# Patient Record
Sex: MALE | Race: WHITE | HISPANIC OR LATINO | Employment: FULL TIME | URBAN - METROPOLITAN AREA
[De-identification: names, ages, dates, MRNs, and addresses within clinical notes are randomized per-mention and may not be internally consistent; named-entity substitution may affect disease eponyms.]

---

## 2024-05-28 ENCOUNTER — HOSPITAL ENCOUNTER (EMERGENCY)
Facility: MEDICAL CENTER | Age: 54
End: 2024-05-28
Attending: EMERGENCY MEDICINE
Payer: OTHER MISCELLANEOUS

## 2024-05-28 VITALS
DIASTOLIC BLOOD PRESSURE: 87 MMHG | RESPIRATION RATE: 16 BRPM | HEART RATE: 70 BPM | TEMPERATURE: 97.8 F | WEIGHT: 211.64 LBS | SYSTOLIC BLOOD PRESSURE: 148 MMHG | OXYGEN SATURATION: 96 %

## 2024-05-28 DIAGNOSIS — S05.01XA ABRASION OF RIGHT CORNEA, INITIAL ENCOUNTER: Primary | ICD-10-CM

## 2024-05-28 DIAGNOSIS — H18.891 CORNEAL RUST RING OF RIGHT EYE: ICD-10-CM

## 2024-05-28 PROCEDURE — 700101 HCHG RX REV CODE 250: Performed by: EMERGENCY MEDICINE

## 2024-05-28 PROCEDURE — 99283 EMERGENCY DEPT VISIT LOW MDM: CPT

## 2024-05-28 RX ORDER — PROPARACAINE HYDROCHLORIDE 5 MG/ML
1 SOLUTION/ DROPS OPHTHALMIC ONCE
Status: COMPLETED | OUTPATIENT
Start: 2024-05-28 | End: 2024-05-28

## 2024-05-28 RX ORDER — ERYTHROMYCIN 5 MG/G
1 OINTMENT OPHTHALMIC 3 TIMES DAILY
Qty: 3.5 G | Refills: 0 | Status: ACTIVE | OUTPATIENT
Start: 2024-05-28 | End: 2024-06-02

## 2024-05-28 RX ADMIN — PROPARACAINE HYDROCHLORIDE 1 DROP: 5 SOLUTION/ DROPS OPHTHALMIC at 15:00

## 2024-05-28 RX ADMIN — FLUORESCEIN SODIUM 1 MG: 1 STRIP OPHTHALMIC at 15:00

## 2024-05-28 NOTE — ED NOTES
Discharge teaching and paperwork provided and all questions/concerns answered. VSS, assessment stable. Given information regarding Rx. Patient discharged to the care of self and ambulated out of the ED.

## 2024-05-28 NOTE — ED TRIAGE NOTES
Pt to triage .  Chief Complaint   Patient presents with    Eye Pain     Pt c/o right eye pain/itching after getting possible insulation in eye at work Friday

## 2024-05-28 NOTE — ED PROVIDER NOTES
ER Provider Note    Scribed for Dwayne Gillis Ii, M.d. by Rain Renteria. 5/28/2024  2:34 PM    Primary Care Provider: No primary care provider noted.     CHIEF COMPLAINT   Chief Complaint   Patient presents with    Eye Pain     Pt c/o right eye pain/itching after getting possible insulation in eye at work Friday          HPI/ROS  LIMITATION TO HISTORY   Select: Language Mexican,  Used  ID: [id number]  OUTSIDE HISTORIAN(S):  None    Slick Whitaker is a 53 y.o. male who presents to the ED complaining of eye pain onset 4 days ago. The patient explains that he had a piece of metal dislodged into the eye, while working on a roof at work. He states he was seen at [facility], where they were able to remove a small piece of the metal, however he states they were unsure if there is any metal still present.  He states Urgent Care recommended that the patient is seen at the ER prior to being seen by an Ophthalmologist. The patient states he is not experiencing any pain currently at bedside.     Mr. Henry Dean brought the records from [facility] (outside urgent care).  I reviewed the records and it details removal of a small piece of metal from the 3 o'clock position of the cornea.  He was given tobramycin drops but unclear if these were prescribed.  Provider noted that they had arranged for him to be seen at ophthalmology, Dr. Mcgrath's clinic      PAST MEDICAL HISTORY  History reviewed. No pertinent past medical history.    SURGICAL HISTORY  No past surgical history noted.     FAMILY HISTORY  No family history noted.     SOCIAL HISTORY   Works construction, installs insulation    CURRENT MEDICATIONS  Previous Medications    No medications noted.        ALLERGIES  Patient has no known allergies.    PHYSICAL EXAM  BP (!) 150/87   Pulse 87   Temp 36.7 °C (98 °F) (Temporal)   Resp 16   Wt 96 kg (211 lb 10.3 oz)   SpO2 100%   Physical Exam  Vitals and nursing note reviewed.    Constitutional:       Appearance: Normal appearance.   Eyes:      Comments: Right eye at 3 oclock position of cornea there is a 2mm area that looks like abrasion vs light rust ring. No obvious foreign body. Fluorescein uptake at the same area. Cornea otherwise clear. Normal and reactive pupils. Normal eye movements.    Neurological:      Mental Status: He is alert.       COURSE & MEDICAL DECISION MAKING     ASSESSMENT, COURSE AND PLAN  Care Narrative:     2:46 PM- The patient was seen and examined at bedside.  He went to [facility] urgent care earlier today with discomfort at his right eye.  He was found to have a piece of metal on the cornea and this was removed.  Provider was concerned that there was residual material and try to arrange for follow-up in the local ophthalmology clinic.  It is unclear what happened because from the notes that patient brought it looks like the urgent care provider had called the ophthalmology clinic and was told that he would be able to be seen that day.  However when patient's employer called they were told that he was not able to be seen.  As result he was referred to our emergency department for further evaluation.  Through slit-lamp exam I can see the area where foreign body had been removed there were some inflammatory changes at the cornea but I cannot see any specific foreign bodies.  There is light rust colored discoloration in the area.  There is fluorescein uptake in this area confirming abrasion to likely secondary to removal of the foreign object.  Visual acuity was equal in both eyes.  20/50 bilaterally.   He may have been prescribed antibiotic drops by the urgent care provider but I cannot confirm this.  As result I prescribed erythromycin ointment and placed a referral for our on-call ophthalmologist clinic, Dr. Walker. Patient had the opportunity to ask any questions. The plan for discharge was discussed with them and they were told to return for any new or worsening  symptoms. He was also informed of the plans for follow up. Patient is understanding and agreeable to the plan for discharge.       PROBLEM LIST  #Right corneal abrasion, status post foreign body removal   -Erythromycin ointment, ophthalmology follow-up for possible rust ring removal    DISPOSITION AND DISCUSSIONS    The patient will return for new or worsening symptoms and is stable at the time of discharge.    DISPOSITION:  Patient will be discharged home in stable condition.    FOLLOW UP:  Fredy Walker M.D.  [address] [place] [address] [phone]    Call today  Tell them you were in the ER and need follow up to have eye rechecked    [facility], Emergency Dept  [facility] [facility] [address] [phone]    losing vision, uncontrolled pain or other serious problems      OUTPATIENT MEDICATIONS:  Discharge Medication List as of 5/28/2024  3:25 PM        START taking these medications    Details   erythromycin 5 MG/GM Ointment Apply 1 Application to right eye 3 times a day for 5 days., Disp-3.5 g, R-0, Normal           FINAL DIANGOSIS  1. Abrasion of right cornea, initial encounter    2. Corneal rust ring of right eye           The note accurately reflects work and decisions made by me.  Dwayne Gillis II, M.D.  5/29/2024  12:59 AM